# Patient Record
Sex: MALE | Race: WHITE | NOT HISPANIC OR LATINO | URBAN - METROPOLITAN AREA
[De-identification: names, ages, dates, MRNs, and addresses within clinical notes are randomized per-mention and may not be internally consistent; named-entity substitution may affect disease eponyms.]

---

## 2019-03-08 ENCOUNTER — OUTPATIENT (OUTPATIENT)
Dept: OUTPATIENT SERVICES | Facility: HOSPITAL | Age: 59
LOS: 1 days | Discharge: HOME | End: 2019-03-08

## 2019-03-08 DIAGNOSIS — R07.9 CHEST PAIN, UNSPECIFIED: ICD-10-CM

## 2019-03-08 DIAGNOSIS — R05 COUGH: ICD-10-CM

## 2019-03-08 DIAGNOSIS — F17.210 NICOTINE DEPENDENCE, CIGARETTES, UNCOMPLICATED: ICD-10-CM

## 2021-03-23 ENCOUNTER — OUTPATIENT (OUTPATIENT)
Dept: OUTPATIENT SERVICES | Facility: HOSPITAL | Age: 61
LOS: 1 days | Discharge: HOME | End: 2021-03-23
Payer: MEDICARE

## 2021-03-23 DIAGNOSIS — R91.8 OTHER NONSPECIFIC ABNORMAL FINDING OF LUNG FIELD: ICD-10-CM

## 2021-03-23 DIAGNOSIS — F17.211 NICOTINE DEPENDENCE, CIGARETTES, IN REMISSION: ICD-10-CM

## 2021-03-23 PROCEDURE — 71271 CT THORAX LUNG CANCER SCR C-: CPT | Mod: 26

## 2021-12-12 ENCOUNTER — FORM ENCOUNTER (OUTPATIENT)
Age: 61
End: 2021-12-12

## 2022-01-24 ENCOUNTER — OUTPATIENT (OUTPATIENT)
Dept: OUTPATIENT SERVICES | Facility: HOSPITAL | Age: 62
LOS: 1 days | Discharge: HOME | End: 2022-01-24
Payer: MEDICARE

## 2022-01-24 ENCOUNTER — OUTPATIENT (OUTPATIENT)
Dept: OUTPATIENT SERVICES | Facility: HOSPITAL | Age: 62
LOS: 1 days | Discharge: HOME | End: 2022-01-24

## 2022-01-24 DIAGNOSIS — E87.5 HYPERKALEMIA: ICD-10-CM

## 2022-01-24 DIAGNOSIS — R07.9 CHEST PAIN, UNSPECIFIED: ICD-10-CM

## 2022-01-24 PROCEDURE — 75574 CT ANGIO HRT W/3D IMAGE: CPT | Mod: 26,MH

## 2022-01-25 DIAGNOSIS — E87.5 HYPERKALEMIA: ICD-10-CM

## 2022-02-08 DIAGNOSIS — R07.9 CHEST PAIN, UNSPECIFIED: ICD-10-CM

## 2022-05-08 ENCOUNTER — FORM ENCOUNTER (OUTPATIENT)
Age: 62
End: 2022-05-08

## 2022-05-09 VITALS
SYSTOLIC BLOOD PRESSURE: 120 MMHG | HEIGHT: 66 IN | BODY MASS INDEX: 35.36 KG/M2 | DIASTOLIC BLOOD PRESSURE: 70 MMHG | WEIGHT: 220 LBS | TEMPERATURE: 97.3 F | HEART RATE: 60 BPM

## 2022-08-24 ENCOUNTER — NON-APPOINTMENT (OUTPATIENT)
Age: 62
End: 2022-08-24

## 2022-08-24 DIAGNOSIS — Z87.891 PERSONAL HISTORY OF NICOTINE DEPENDENCE: ICD-10-CM

## 2022-08-24 DIAGNOSIS — Z80.0 FAMILY HISTORY OF MALIGNANT NEOPLASM OF DIGESTIVE ORGANS: ICD-10-CM

## 2022-08-24 DIAGNOSIS — Z80.1 FAMILY HISTORY OF MALIGNANT NEOPLASM OF TRACHEA, BRONCHUS AND LUNG: ICD-10-CM

## 2022-08-24 DIAGNOSIS — Z78.9 OTHER SPECIFIED HEALTH STATUS: ICD-10-CM

## 2022-08-25 ENCOUNTER — NON-APPOINTMENT (OUTPATIENT)
Age: 62
End: 2022-08-25

## 2022-09-09 ENCOUNTER — APPOINTMENT (OUTPATIENT)
Dept: CARDIOLOGY | Facility: CLINIC | Age: 62
End: 2022-09-09

## 2022-09-09 VITALS — HEART RATE: 70 BPM | DIASTOLIC BLOOD PRESSURE: 70 MMHG | SYSTOLIC BLOOD PRESSURE: 130 MMHG

## 2022-09-09 VITALS — BODY MASS INDEX: 36.16 KG/M2 | WEIGHT: 225 LBS | HEIGHT: 66 IN

## 2022-09-09 DIAGNOSIS — R94.31 ABNORMAL ELECTROCARDIOGRAM [ECG] [EKG]: ICD-10-CM

## 2022-09-09 PROCEDURE — 99205 OFFICE O/P NEW HI 60 MIN: CPT

## 2022-09-09 PROCEDURE — 99215 OFFICE O/P EST HI 40 MIN: CPT

## 2022-09-09 NOTE — HISTORY OF PRESENT ILLNESS
[FreeTextEntry1] : Pt with COPD, exsmoker , CAD 22: 30% NONOBSTRUCTIVE CAD, DISTAL TAPERED VESSEL OF LAD, RCA MID SEQUENTIAL 30-40% lesions, LAD MYOCARDIAL BRIDGING. \par \par pt exercising 3 times were weak at gym with no anginal symptoms, pt denies cp or sob. HAS MYOCARDIAL BRIDGIING BUT NO HEAVY BREATHING. \par \par \par \par GFR: 89\par LDL 62 T \par A1c 5.3  1

## 2022-09-09 NOTE — DISCUSSION/SUMMARY
[FreeTextEntry1] : Sob resolved and has bridging but under control on meds \par \par pt LDL good range \par continue current meangement \par get 2 d echo \par

## 2023-03-01 ENCOUNTER — APPOINTMENT (OUTPATIENT)
Dept: CARDIOLOGY | Facility: CLINIC | Age: 63
End: 2023-03-01
Payer: MEDICARE

## 2023-03-01 PROCEDURE — 93306 TTE W/DOPPLER COMPLETE: CPT

## 2023-04-07 ENCOUNTER — APPOINTMENT (OUTPATIENT)
Dept: CARDIOLOGY | Facility: CLINIC | Age: 63
End: 2023-04-07
Payer: MEDICARE

## 2023-04-07 VITALS — HEIGHT: 66 IN | WEIGHT: 231.38 LBS | BODY MASS INDEX: 37.18 KG/M2

## 2023-04-07 VITALS — SYSTOLIC BLOOD PRESSURE: 138 MMHG | DIASTOLIC BLOOD PRESSURE: 80 MMHG | HEART RATE: 70 BPM

## 2023-04-07 PROCEDURE — 99214 OFFICE O/P EST MOD 30 MIN: CPT

## 2023-04-07 RX ORDER — ASPIRIN 81 MG
81 TABLET, DELAYED RELEASE (ENTERIC COATED) ORAL
Refills: 0 | Status: DISCONTINUED | COMMUNITY
End: 2023-04-07

## 2023-04-07 RX ORDER — ROSUVASTATIN CALCIUM 10 MG/1
10 TABLET, FILM COATED ORAL
Refills: 0 | Status: DISCONTINUED | COMMUNITY
End: 2023-04-07

## 2023-04-07 RX ORDER — METOPROLOL TARTRATE 25 MG/1
25 TABLET, FILM COATED ORAL TWICE DAILY
Refills: 0 | Status: DISCONTINUED | COMMUNITY
End: 2023-04-07

## 2023-04-07 RX ORDER — RANOLAZINE 500 MG/1
500 TABLET, FILM COATED, EXTENDED RELEASE ORAL
Refills: 0 | Status: DISCONTINUED | COMMUNITY
End: 2023-04-07

## 2023-04-07 NOTE — DISCUSSION/SUMMARY
[FreeTextEntry1] : Sob resolved and has bridging but under control on meds \par \par pt LDL good range \par continue current meangement \par \par 4/7/23: \par pt with edema in legs seems venous insuffiency \par get bloodwork \par off of ranexa and aspirin an dmetoprolol as bridging and nonobstructive cad \par GDMT

## 2023-04-07 NOTE — CARDIOLOGY SUMMARY
[de-identified] : 3/1/23: \par 1. The left ventricular systolic function is mildly decreased with an ejection fraction of 61 %. There is normal left ventricular diastolic function.\par 2. E' sept: 0.07 m/s GLS -16.7%.\par 3. Mild to moderate left ventricular hypertrophy.\par 4. 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

## 2023-04-07 NOTE — HISTORY OF PRESENT ILLNESS
[FreeTextEntry1] : Pt with COPD, exsmoker , CAD 22: 30% NONOBSTRUCTIVE CAD, DISTAL TAPERED VESSEL OF LAD, RCA MID SEQUENTIAL 30-40% lesions, LAD MYOCARDIAL BRIDGING. MILD TO MOD LVH \par \par pt exercising 3 times were weak at gym with no anginal symptoms, pt denies cp or sob. HAS MYOCARDIAL BRIDGIING BUT NO HEAVY BREATHING. \par \par GFR: 89\par LDL 62 T \par A1c 5.3 \par \par 23: \par 3/1/23: mild to mod LVH, gls -16.7%, e' 0.07 m/s \par LpA 10 ApoB: 59 \par pt says sob worsened with extra weight for 5 to 7 lbs gain. pt complains of edema in right leg due to most like venous insuffiency better in am.

## 2023-05-24 ENCOUNTER — APPOINTMENT (OUTPATIENT)
Dept: CARDIOLOGY | Facility: CLINIC | Age: 63
End: 2023-05-24
Payer: MEDICARE

## 2023-05-24 PROCEDURE — 93970 EXTREMITY STUDY: CPT

## 2023-08-03 ENCOUNTER — APPOINTMENT (OUTPATIENT)
Dept: ORTHOPEDIC SURGERY | Facility: CLINIC | Age: 63
End: 2023-08-03
Payer: MEDICARE

## 2023-08-03 DIAGNOSIS — M16.0 BILATERAL PRIMARY OSTEOARTHRITIS OF HIP: ICD-10-CM

## 2023-08-03 PROCEDURE — 73502 X-RAY EXAM HIP UNI 2-3 VIEWS: CPT

## 2023-08-03 PROCEDURE — 99204 OFFICE O/P NEW MOD 45 MIN: CPT

## 2023-08-03 NOTE — ASSESSMENT
[FreeTextEntry1] : 63-year-old gentleman with severe left hip arthritis and moderately severe right hip arthritis.  We talked about treatment options.  With degree of arthritic changes in his left hip and the degree of pain that he is experiencing both on physical examination by history I think he is best served with a left hip replacement through a direct anterior approach.  He would like to proceed with surgery at this time a fashion as possible.  I have reviewed with him the risk benefits and alternatives.  I have answered his questions to his satisfaction.  I will give him a limited supply of tramadol that he will take at night for severe pain while he awaits surgery.  He will need preadmission testing.

## 2023-08-03 NOTE — IMAGING
[de-identified] : Pleasant middle-age gentleman sits and stands reasonably comfortably in my office.  He is clearly more comfortable standing than sitting.  Physical examination: Left hip: Tenderness palpation in the inguinal crease.  No discrete lymph nodes in inguinal crease.  He essentially has no internal rotation of his hip joint and full extension and at 9 degrees of flexion and he does have a slight flexion contracture in his left hip.  No tenderness palpation over the greater trochanteric bursa.  He walks with an antalgic gait.  Radiographs: Bilateral hips (AP pelvis, lateral) (: Severe left hip arthritis with loss of the left hip joint space (0.9 mm) and significant narrowing of the right hip joint space (1.7 mm).  There marginal osteophytes of the lateral aspects of the femoral head.  Early subchondral sclerotic changes of the left hip joint.  Right hip is calcifications of the labrum but less of the sclerotic changes.  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

## 2023-08-03 NOTE — HISTORY OF PRESENT ILLNESS
[de-identified] : 63-year-old gentleman presents this office for evaluation of bilateral groin pain left worse than the right for at least a year.  Over the last 7 months this pain has become severely painful.  It is limiting his ambulation to less than oh walk and he cannot sit for more than 10 minutes before he has to stand up and walk around.  He notes that his difficulty lately straightening his hip out completely.  Tried nonsteroidal anti-inflammatory medications without relief of his symptoms.  He has a past orthopedic history significant for spinal surgery which was revised in 2016.  Pain is fairly well localized to his groin without significant radiation.  No clear sensory complaints.

## 2023-08-04 RX ORDER — TRAMADOL HYDROCHLORIDE 50 MG/1
50 TABLET, COATED ORAL
Qty: 20 | Refills: 0 | Status: ACTIVE | COMMUNITY
Start: 2023-08-03 | End: 1900-01-01

## 2023-08-08 RX ORDER — ROSUVASTATIN CALCIUM 20 MG/1
20 TABLET, FILM COATED ORAL
Qty: 90 | Refills: 3 | Status: ACTIVE | COMMUNITY
Start: 1900-01-01 | End: 1900-01-01

## 2023-10-10 ENCOUNTER — APPOINTMENT (OUTPATIENT)
Dept: ORTHOPEDIC SURGERY | Facility: CLINIC | Age: 63
End: 2023-10-10

## 2023-10-12 ENCOUNTER — APPOINTMENT (OUTPATIENT)
Dept: ORTHOPEDIC SURGERY | Facility: HOSPITAL | Age: 63
End: 2023-10-12

## 2023-10-18 ENCOUNTER — APPOINTMENT (OUTPATIENT)
Dept: CARDIOLOGY | Facility: CLINIC | Age: 63
End: 2023-10-18
Payer: MEDICARE

## 2023-10-18 VITALS — WEIGHT: 224 LBS | HEIGHT: 66 IN | BODY MASS INDEX: 36 KG/M2

## 2023-10-18 VITALS — DIASTOLIC BLOOD PRESSURE: 80 MMHG | HEART RATE: 74 BPM | SYSTOLIC BLOOD PRESSURE: 130 MMHG

## 2023-10-18 DIAGNOSIS — I45.10 UNSPECIFIED RIGHT BUNDLE-BRANCH BLOCK: ICD-10-CM

## 2023-10-18 DIAGNOSIS — R06.02 SHORTNESS OF BREATH: ICD-10-CM

## 2023-10-18 PROCEDURE — 93000 ELECTROCARDIOGRAM COMPLETE: CPT

## 2023-10-18 PROCEDURE — 99214 OFFICE O/P EST MOD 30 MIN: CPT

## 2023-10-27 ENCOUNTER — APPOINTMENT (OUTPATIENT)
Dept: ORTHOPEDIC SURGERY | Facility: CLINIC | Age: 63
End: 2023-10-27

## 2024-03-11 ENCOUNTER — APPOINTMENT (OUTPATIENT)
Dept: CARDIOLOGY | Facility: CLINIC | Age: 64
End: 2024-03-11
Payer: MEDICARE

## 2024-03-11 PROCEDURE — 93306 TTE W/DOPPLER COMPLETE: CPT

## 2024-03-18 ENCOUNTER — APPOINTMENT (OUTPATIENT)
Dept: CARDIOLOGY | Facility: CLINIC | Age: 64
End: 2024-03-18
Payer: MEDICARE

## 2024-03-18 VITALS — HEART RATE: 70 BPM | SYSTOLIC BLOOD PRESSURE: 120 MMHG | DIASTOLIC BLOOD PRESSURE: 80 MMHG

## 2024-03-18 VITALS — WEIGHT: 220 LBS | HEIGHT: 66 IN | BODY MASS INDEX: 35.36 KG/M2

## 2024-03-18 DIAGNOSIS — I45.10 UNSPECIFIED RIGHT BUNDLE-BRANCH BLOCK: ICD-10-CM

## 2024-03-18 DIAGNOSIS — Z00.00 ENCOUNTER FOR GENERAL ADULT MEDICAL EXAMINATION W/OUT ABNORMAL FINDINGS: ICD-10-CM

## 2024-03-18 DIAGNOSIS — J44.9 CHRONIC OBSTRUCTIVE PULMONARY DISEASE, UNSPECIFIED: ICD-10-CM

## 2024-03-18 DIAGNOSIS — R94.30 ABNORMAL RESULT OF CARDIOVASCULAR FUNCTION STUDY, UNSPECIFIED: ICD-10-CM

## 2024-03-18 DIAGNOSIS — E78.5 HYPERLIPIDEMIA, UNSPECIFIED: ICD-10-CM

## 2024-03-18 DIAGNOSIS — I10 ESSENTIAL (PRIMARY) HYPERTENSION: ICD-10-CM

## 2024-03-18 DIAGNOSIS — Q24.5 MALFORMATION OF CORONARY VESSELS: ICD-10-CM

## 2024-03-18 DIAGNOSIS — R60.0 LOCALIZED EDEMA: ICD-10-CM

## 2024-03-18 PROCEDURE — 99214 OFFICE O/P EST MOD 30 MIN: CPT

## 2024-03-18 NOTE — CARDIOLOGY SUMMARY
[de-identified] : 3/1/23: \par  1. The left ventricular systolic function is mildly decreased with an ejection fraction of 61 %. There is normal left ventricular diastolic function.\par  2. E' sept: 0.07 m/s GLS -16.7%.\par  3. Mild to moderate left ventricular hypertrophy.\par  4. 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

## 2024-03-18 NOTE — HISTORY OF PRESENT ILLNESS
[FreeTextEntry1] : Pt with CAD 22: 30% NONOBSTRUCTIVE CAD, DISTAL TAPERED VESSEL OF LAD, RCA MID SEQUENTIAL 30-40% lesions, LAD MYOCARDIAL BRIDGING, COPD, exsmoker , MILD TO MOD LVH improved to normal in , RBBB   pt exercising 3 times were weak at gym with no anginal symptoms, pt denies cp or sob. HAS MYOCARDIAL BRIDGIING BUT NO HEAVY BREATHING.   GFR: 89 LDL 62 T  A1c 5.3   23:  3/1/23: mild to mod LVH, gls -16.7%, e' 0.07 m/s  LpA 10 ApoB: 59  pt says sob worsened with extra weight for 5 to 7 lbs gain. pt complains of edema in right leg due to most like venous insuffiency better in am.   10/18/23:  HDL: 72, T. LDL: 71, pt needs knee surgery right knee and hip replacement after and says staying active and sob is imrpoved. pt edema from ? knee per orthopedic. pt getting surgery in [place] with [facility].  pt denies cp.  3/18/24:  3/4/24: LDL: 61, T, HDL: 68, BNP: 40 3/11/24: Echo: lvef normal 66%, normal DD2, no LVH improved from moderate in the past.  pt hip sx 5 WEEKS AGO, pt getting PT, sore with walking on hip after 2 blocks, no cp or sob.

## 2024-03-18 NOTE — DISCUSSION/SUMMARY
[FreeTextEntry1] : Sob resolved and has bridging but under control on meds  pt LDL good range  continue rosuvastatin 20 mg po qhs  off of ranexa and aspirin and metoprolol as bridging is etiology and nonobstructive cad  pt does not have symptoms, can use ranexa if has symptoms  2 d echo LVH improved in 2024 DD2 but not very active.  f/u in 6 months get bloodwork.

## 2024-07-26 ENCOUNTER — RX RENEWAL (OUTPATIENT)
Age: 64
End: 2024-07-26

## 2024-09-15 ENCOUNTER — NON-APPOINTMENT (OUTPATIENT)
Age: 64
End: 2024-09-15

## 2024-09-16 ENCOUNTER — APPOINTMENT (OUTPATIENT)
Dept: CARDIOLOGY | Facility: CLINIC | Age: 64
End: 2024-09-16
Payer: MEDICARE

## 2024-09-16 VITALS — HEIGHT: 66 IN | WEIGHT: 212 LBS | BODY MASS INDEX: 34.07 KG/M2

## 2024-09-16 VITALS — SYSTOLIC BLOOD PRESSURE: 140 MMHG | HEART RATE: 70 BPM | DIASTOLIC BLOOD PRESSURE: 80 MMHG

## 2024-09-16 DIAGNOSIS — I45.10 UNSPECIFIED RIGHT BUNDLE-BRANCH BLOCK: ICD-10-CM

## 2024-09-16 DIAGNOSIS — Q24.5 MALFORMATION OF CORONARY VESSELS: ICD-10-CM

## 2024-09-16 DIAGNOSIS — E78.5 HYPERLIPIDEMIA, UNSPECIFIED: ICD-10-CM

## 2024-09-16 DIAGNOSIS — I10 ESSENTIAL (PRIMARY) HYPERTENSION: ICD-10-CM

## 2024-09-16 DIAGNOSIS — R60.0 LOCALIZED EDEMA: ICD-10-CM

## 2024-09-16 DIAGNOSIS — J44.9 CHRONIC OBSTRUCTIVE PULMONARY DISEASE, UNSPECIFIED: ICD-10-CM

## 2024-09-16 DIAGNOSIS — R06.02 SHORTNESS OF BREATH: ICD-10-CM

## 2024-09-16 PROCEDURE — 99214 OFFICE O/P EST MOD 30 MIN: CPT

## 2024-09-16 PROCEDURE — G2211 COMPLEX E/M VISIT ADD ON: CPT

## 2024-09-16 RX ORDER — EZETIMIBE 10 MG/1
10 TABLET ORAL
Qty: 90 | Refills: 3 | Status: ACTIVE | COMMUNITY
Start: 2024-09-16 | End: 1900-01-01

## 2024-09-16 NOTE — CARDIOLOGY SUMMARY
[de-identified] : 3/1/23: \par  1. The left ventricular systolic function is mildly decreased with an ejection fraction of 61 %. There is normal left ventricular diastolic function.\par  2. E' sept: 0.07 m/s GLS -16.7%.\par  3. Mild to moderate left ventricular hypertrophy.\par  4. 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

## 2024-09-16 NOTE — HISTORY OF PRESENT ILLNESS
[FreeTextEntry1] : Pt with CAD 22: 30% NONOBSTRUCTIVE CAD, DISTAL TAPERED VESSEL OF LAD, RCA MID SEQUENTIAL 30-40% lesions, LAD MYOCARDIAL BRIDGING, COPD, exsmoker , MILD TO MOD LVH improved to normal in , RBBB   pt exercising 3 times were weak at gym with no anginal symptoms, pt denies cp or sob. HAS MYOCARDIAL BRIDGIING BUT NO HEAVY BREATHING.   GFR: 89 LDL 62 T  A1c 5.3   23:  3/1/23: mild to mod LVH, gls -16.7%, e' 0.07 m/s  LpA 10 ApoB: 59  pt says sob worsened with extra weight for 5 to 7 lbs gain. pt complains of edema in right leg due to most like venous insuffiency better in am.   10/18/23:  HDL: 72, T. LDL: 71, pt needs knee surgery right knee and hip replacement after and says staying active and sob is imrpoved. pt edema from ? knee per orthopedic. pt getting surgery in [place] with [facility].  pt denies cp.  3/18/24:  3/4/24: LDL: 61, T, HDL: 68, BNP: 40 3/11/24: Echo: lvef normal 66%, normal DD2, no LVH improved from moderate in the past.  pt hip sx 5 WEEKS AGO, pt getting PT, sore with walking on hip after 2 blocks, no cp or sob.   24: 24: BNP: <36, HDL: 74, T, LDL: 71 increased vs last visit at 61.  pt not very active but denies cp or sob. pt slowed down with exercise due to issues with his family but plans to start again.  pt says sob only if walks fast.  pt recovered from left hip and will need surgery on right hip in february.

## 2024-09-16 NOTE — HISTORY OF PRESENT ILLNESS
[FreeTextEntry1] : Pt with CAD 22: 30% NONOBSTRUCTIVE CAD, DISTAL TAPERED VESSEL OF LAD, RCA MID SEQUENTIAL 30-40% lesions, LAD MYOCARDIAL BRIDGING, COPD, exsmoker , MILD TO MOD LVH improved to normal in , RBBB   pt exercising 3 times were weak at gym with no anginal symptoms, pt denies cp or sob. HAS MYOCARDIAL BRIDGIING BUT NO HEAVY BREATHING.   GFR: 89 LDL 62 T  A1c 5.3   23:  3/1/23: mild to mod LVH, gls -16.7%, e' 0.07 m/s  LpA 10 ApoB: 59  pt says sob worsened with extra weight for 5 to 7 lbs gain. pt complains of edema in right leg due to most like venous insuffiency better in am.   10/18/23:  HDL: 72, T. LDL: 71, pt needs knee surgery right knee and hip replacement after and says staying active and sob is imrpoved. pt edema from ? knee per orthopedic. pt getting surgery in [place] with [facility].  pt denies cp.  3/18/24:  3/4/24: LDL: 61, T, HDL: 68, BNP: 40 3/11/24: Echo: lvef normal 66%, normal DD2, no LVH improved from moderate in the past.  pt hip sx 5 WEEKS AGO, pt getting PT, sore with walking on hip after 2 blocks, no cp or sob.   24: 24: BNP: <36, HDL: 74, T, LDL: 71 increased vs last visit at 61.  pt not very active but denies cp or sob. pt slowed down with exercise due to issues with his family but plans to start again.  pt says sob only if walks fast.  pt recovered from left hip and will need surgery on right hip in february.

## 2024-09-16 NOTE — CARDIOLOGY SUMMARY
[de-identified] : 3/1/23: \par  1. The left ventricular systolic function is mildly decreased with an ejection fraction of 61 %. There is normal left ventricular diastolic function.\par  2. E' sept: 0.07 m/s GLS -16.7%.\par  3. Mild to moderate left ventricular hypertrophy.\par  4. 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

## 2025-01-08 ENCOUNTER — APPOINTMENT (OUTPATIENT)
Dept: CARDIOLOGY | Facility: CLINIC | Age: 65
End: 2025-01-08

## 2025-02-06 ENCOUNTER — APPOINTMENT (OUTPATIENT)
Dept: CARDIOLOGY | Facility: CLINIC | Age: 65
End: 2025-02-06
Payer: MEDICARE

## 2025-02-06 DIAGNOSIS — E78.5 HYPERLIPIDEMIA, UNSPECIFIED: ICD-10-CM

## 2025-02-06 DIAGNOSIS — Q24.5 MALFORMATION OF CORONARY VESSELS: ICD-10-CM

## 2025-02-06 DIAGNOSIS — I10 ESSENTIAL (PRIMARY) HYPERTENSION: ICD-10-CM

## 2025-02-06 PROCEDURE — 93306 TTE W/DOPPLER COMPLETE: CPT

## 2025-02-06 PROCEDURE — 93356 MYOCRD STRAIN IMG SPCKL TRCK: CPT

## 2025-02-10 ENCOUNTER — NON-APPOINTMENT (OUTPATIENT)
Age: 65
End: 2025-02-10

## 2025-02-10 ENCOUNTER — APPOINTMENT (OUTPATIENT)
Dept: CARDIOLOGY | Facility: CLINIC | Age: 65
End: 2025-02-10
Payer: MEDICARE

## 2025-02-10 VITALS
BODY MASS INDEX: 34.07 KG/M2 | DIASTOLIC BLOOD PRESSURE: 72 MMHG | SYSTOLIC BLOOD PRESSURE: 120 MMHG | HEART RATE: 101 BPM | OXYGEN SATURATION: 96 % | HEIGHT: 66 IN | WEIGHT: 212 LBS

## 2025-02-10 DIAGNOSIS — R06.02 SHORTNESS OF BREATH: ICD-10-CM

## 2025-02-10 DIAGNOSIS — R60.0 LOCALIZED EDEMA: ICD-10-CM

## 2025-02-10 DIAGNOSIS — R94.31 ABNORMAL ELECTROCARDIOGRAM [ECG] [EKG]: ICD-10-CM

## 2025-02-10 DIAGNOSIS — I45.10 UNSPECIFIED RIGHT BUNDLE-BRANCH BLOCK: ICD-10-CM

## 2025-02-10 DIAGNOSIS — R94.30 ABNORMAL RESULT OF CARDIOVASCULAR FUNCTION STUDY, UNSPECIFIED: ICD-10-CM

## 2025-02-10 PROCEDURE — 99204 OFFICE O/P NEW MOD 45 MIN: CPT

## 2025-02-10 PROCEDURE — G2211 COMPLEX E/M VISIT ADD ON: CPT

## 2025-08-12 ENCOUNTER — APPOINTMENT (OUTPATIENT)
Dept: CARDIOLOGY | Facility: CLINIC | Age: 65
End: 2025-08-12
Payer: MEDICARE

## 2025-08-12 VITALS
BODY MASS INDEX: 32.95 KG/M2 | HEART RATE: 66 BPM | WEIGHT: 205 LBS | SYSTOLIC BLOOD PRESSURE: 122 MMHG | DIASTOLIC BLOOD PRESSURE: 82 MMHG | HEIGHT: 66 IN

## 2025-08-12 DIAGNOSIS — E78.5 HYPERLIPIDEMIA, UNSPECIFIED: ICD-10-CM

## 2025-08-12 DIAGNOSIS — I45.10 UNSPECIFIED RIGHT BUNDLE-BRANCH BLOCK: ICD-10-CM

## 2025-08-12 DIAGNOSIS — R94.30 ABNORMAL RESULT OF CARDIOVASCULAR FUNCTION STUDY, UNSPECIFIED: ICD-10-CM

## 2025-08-12 DIAGNOSIS — I10 ESSENTIAL (PRIMARY) HYPERTENSION: ICD-10-CM

## 2025-08-12 DIAGNOSIS — J44.9 CHRONIC OBSTRUCTIVE PULMONARY DISEASE, UNSPECIFIED: ICD-10-CM

## 2025-08-12 PROCEDURE — 99214 OFFICE O/P EST MOD 30 MIN: CPT

## 2025-08-12 PROCEDURE — G2211 COMPLEX E/M VISIT ADD ON: CPT

## 2025-08-12 PROCEDURE — 93000 ELECTROCARDIOGRAM COMPLETE: CPT

## 2025-08-12 RX ORDER — TELMISARTAN 40 MG/1
40 TABLET ORAL
Refills: 0 | Status: ACTIVE | COMMUNITY
Start: 2025-08-12

## 2025-09-04 ENCOUNTER — LABORATORY RESULT (OUTPATIENT)
Age: 65
End: 2025-09-04

## 2025-09-04 ENCOUNTER — APPOINTMENT (OUTPATIENT)
Dept: PAIN MANAGEMENT | Facility: CLINIC | Age: 65
End: 2025-09-04
Payer: MEDICARE

## 2025-09-04 VITALS — BODY MASS INDEX: 33.75 KG/M2 | WEIGHT: 210 LBS | HEIGHT: 66 IN

## 2025-09-04 DIAGNOSIS — M96.1 POSTLAMINECTOMY SYNDROME, NOT ELSEWHERE CLASSIFIED: ICD-10-CM

## 2025-09-04 DIAGNOSIS — M48.02 SPINAL STENOSIS, CERVICAL REGION: ICD-10-CM

## 2025-09-04 PROCEDURE — 99204 OFFICE O/P NEW MOD 45 MIN: CPT

## 2025-09-05 RX ORDER — OXYCODONE AND ACETAMINOPHEN 7.5; 325 MG/1; MG/1
7.5-325 TABLET ORAL 3 TIMES DAILY
Qty: 90 | Refills: 0 | Status: ACTIVE | COMMUNITY
Start: 2025-09-05 | End: 1900-01-01

## 2025-09-09 LAB

## 2025-09-10 ENCOUNTER — APPOINTMENT (OUTPATIENT)
Dept: PAIN MANAGEMENT | Facility: CLINIC | Age: 65
End: 2025-09-10
Payer: MEDICARE

## 2025-09-10 DIAGNOSIS — M54.12 RADICULOPATHY, CERVICAL REGION: ICD-10-CM

## 2025-09-10 PROCEDURE — 62321 NJX INTERLAMINAR CRV/THRC: CPT
